# Patient Record
Sex: FEMALE | Race: WHITE | Employment: OTHER | URBAN - METROPOLITAN AREA
[De-identification: names, ages, dates, MRNs, and addresses within clinical notes are randomized per-mention and may not be internally consistent; named-entity substitution may affect disease eponyms.]

---

## 2018-05-09 ENCOUNTER — HOSPITAL ENCOUNTER (OUTPATIENT)
Dept: MAMMOGRAPHY | Age: 73
Discharge: HOME | End: 2018-05-09
Attending: FAMILY MEDICINE
Payer: MEDICARE

## 2018-05-09 DIAGNOSIS — Z12.31 ENCOUNTER FOR SCREENING MAMMOGRAM FOR MALIGNANT NEOPLASM OF BREAST: ICD-10-CM

## 2018-05-09 PROCEDURE — 77067 SCR MAMMO BI INCL CAD: CPT

## 2018-05-10 NOTE — PROGRESS NOTES
Mammogram - needs additional imaging for right breast due to indeterminate calcifications.   Radiology should contact patient to schedule

## 2018-05-16 ENCOUNTER — HOSPITAL ENCOUNTER (OUTPATIENT)
Dept: MAMMOGRAPHY | Age: 73
Discharge: HOME | End: 2018-05-16
Attending: FAMILY MEDICINE
Payer: MEDICARE

## 2018-05-16 DIAGNOSIS — R92.8 ABNORMAL MAMMOGRAM: ICD-10-CM

## 2018-05-16 PROCEDURE — 77065 DX MAMMO INCL CAD UNI: CPT

## 2018-05-30 ENCOUNTER — HOSPITAL ENCOUNTER (OUTPATIENT)
Dept: MAMMOGRAPHY | Age: 73
Discharge: HOME | End: 2018-05-30
Attending: FAMILY MEDICINE
Payer: MEDICARE

## 2018-05-30 VITALS
OXYGEN SATURATION: 94 % | SYSTOLIC BLOOD PRESSURE: 190 MMHG | TEMPERATURE: 96.9 F | HEART RATE: 78 BPM | DIASTOLIC BLOOD PRESSURE: 83 MMHG

## 2018-05-30 DIAGNOSIS — R92.1 BREAST CALCIFICATION, RIGHT: ICD-10-CM

## 2018-05-30 PROCEDURE — 74011250636 HC RX REV CODE- 250/636: Performed by: FAMILY MEDICINE

## 2018-05-30 PROCEDURE — 74011000250 HC RX REV CODE- 250: Performed by: FAMILY MEDICINE

## 2018-05-30 PROCEDURE — 19081 BX BREAST 1ST LESION STRTCTC: CPT

## 2018-05-30 PROCEDURE — 88305 TISSUE EXAM BY PATHOLOGIST: CPT | Performed by: FAMILY MEDICINE

## 2018-05-30 PROCEDURE — 77065 DX MAMMO INCL CAD UNI: CPT

## 2018-05-30 RX ORDER — LIDOCAINE HYDROCHLORIDE AND EPINEPHRINE 10; 10 MG/ML; UG/ML
10 INJECTION, SOLUTION INFILTRATION; PERINEURAL
Status: COMPLETED | OUTPATIENT
Start: 2018-05-30 | End: 2018-05-30

## 2018-05-30 RX ORDER — LIDOCAINE HYDROCHLORIDE 10 MG/ML
10 INJECTION INFILTRATION; PERINEURAL
Status: COMPLETED | OUTPATIENT
Start: 2018-05-30 | End: 2018-05-30

## 2018-05-30 RX ORDER — LIDOCAINE HYDROCHLORIDE AND EPINEPHRINE 10; 10 MG/ML; UG/ML
1.5 INJECTION, SOLUTION INFILTRATION; PERINEURAL
Status: COMPLETED | OUTPATIENT
Start: 2018-05-30 | End: 2018-05-30

## 2018-05-30 RX ADMIN — LIDOCAINE HYDROCHLORIDE,EPINEPHRINE BITARTRATE 3 ML: 10; .01 INJECTION, SOLUTION INFILTRATION; PERINEURAL at 12:02

## 2018-05-30 RX ADMIN — SODIUM CHLORIDE 250 ML: 900 INJECTION, SOLUTION INTRAVENOUS at 12:02

## 2018-05-30 RX ADMIN — LIDOCAINE HYDROCHLORIDE,EPINEPHRINE BITARTRATE 10 ML: 10; .01 INJECTION, SOLUTION INFILTRATION; PERINEURAL at 12:01

## 2018-05-30 RX ADMIN — LIDOCAINE HYDROCHLORIDE 4 ML: 10 INJECTION, SOLUTION INFILTRATION; PERINEURAL at 12:01

## 2019-03-07 PROBLEM — E66.01 SEVERE OBESITY (HCC): Status: ACTIVE | Noted: 2019-03-07

## 2019-03-20 PROBLEM — E11.65 UNCONTROLLED TYPE 2 DIABETES MELLITUS WITH HYPERGLYCEMIA (HCC): Status: ACTIVE | Noted: 2019-03-20

## 2019-04-08 ENCOUNTER — HOSPITAL ENCOUNTER (OUTPATIENT)
Dept: DIABETES SERVICES | Age: 74
Discharge: HOME | End: 2019-04-08
Attending: FAMILY MEDICINE
Payer: MEDICARE

## 2019-04-08 PROCEDURE — G0108 DIAB MANAGE TRN  PER INDIV: HCPCS

## 2019-04-08 NOTE — PROGRESS NOTES
Came for diabetes educational assessment today. Provided basic information on carbohydrates, proteins and fats. Educational need/plan: Will attend 2 nutrition/2 diabetes group sessions to address the following: diabetes disease process, nutritional management, physical activity, using medications, preventing complications, psychosocial adjustment, goal setting, problem solving, monitoring, behavior change strategies. Pt declined a glucometer. Pt has lost 20 lbs since Jan. 2019 since joining [facility].

## 2019-05-02 ENCOUNTER — HOSPITAL ENCOUNTER (OUTPATIENT)
Dept: DIABETES SERVICES | Age: 74
Discharge: HOME | End: 2019-05-02
Payer: MEDICARE

## 2019-05-02 PROCEDURE — G0109 DIAB MANAGE TRN IND/GROUP: HCPCS

## 2019-05-16 ENCOUNTER — HOSPITAL ENCOUNTER (OUTPATIENT)
Dept: DIABETES SERVICES | Age: 74
Discharge: HOME | End: 2019-05-16
Payer: MEDICARE

## 2019-05-16 PROCEDURE — G0109 DIAB MANAGE TRN IND/GROUP: HCPCS

## 2019-05-16 NOTE — PROGRESS NOTES
Attended nutrition diabetes #2 group session today. Topics included: plate method for portion control; fiber and sodium guidelines; sugar substitutes; alcohol; eating out; recipe modification; label reading. Participant's nutrition goal: To lower A1C and lose wt, she will limit carbs to 45 gram with most meals and eat a 7 gram protein with most meals and re-evaluate in 3 months. Participant's nutrition support plan: read food labels, use the [employer] guide, healthy dining finder web site and meal planning guide. Barriers identified by participant: cooking for  and eating out Voiced/demonstrated understanding of material covered. Anticipated adherence is good. Plan for follow up is attend diabetes 2 class.

## 2019-05-30 ENCOUNTER — HOSPITAL ENCOUNTER (OUTPATIENT)
Dept: DIABETES SERVICES | Age: 74
Discharge: HOME | End: 2019-05-30
Payer: MEDICARE

## 2019-05-30 PROCEDURE — G0109 DIAB MANAGE TRN IND/GROUP: HCPCS

## 2019-05-30 NOTE — PROGRESS NOTES
Participant attended Diabetes #2 session today. Topics included: Prevention/detection/treatment of chronic complications; sleep apnea; Developing strategies to promote health/change behavior/recommended screenings; Developing strategies to address psychosocial issues; Goal setting. Participants goal/support plan includes Diabetes Goal:  To exercise to lose weight. I will add pool walking 20-30 minutes neighborhood/pool 3-5 times a week began yesterday 5- and after 3 months will increase as tolerated to 5 times a week. Diabetes Plan:   and patient exercising together .  Problems/barriers may be:none anticipated   Plan for follow up/Recommendations: mail follow up survey in 3 months

## 2019-06-20 ENCOUNTER — HOSPITAL ENCOUNTER (OUTPATIENT)
Dept: MAMMOGRAPHY | Age: 74
Discharge: HOME | End: 2019-06-20
Attending: FAMILY MEDICINE
Payer: MEDICARE

## 2019-06-20 DIAGNOSIS — Z12.31 SCREENING MAMMOGRAM, ENCOUNTER FOR: ICD-10-CM

## 2019-06-20 PROCEDURE — 77067 SCR MAMMO BI INCL CAD: CPT

## 2019-07-31 ENCOUNTER — HOSPITAL ENCOUNTER (OUTPATIENT)
Dept: DIABETES SERVICES | Age: 74
Discharge: HOME | End: 2019-07-31
Payer: MEDICARE

## 2019-07-31 PROCEDURE — G0109 DIAB MANAGE TRN IND/GROUP: HCPCS

## 2019-07-31 NOTE — PROGRESS NOTES
Attended diabetes follow up group class. Open forum for clients to ask questions based on entire diabetes self management education program. Education topics are driven in this class based on clients' individual questions and needs. Topics included: carbohydrates, proteins, fats, fiber, meal planning, weight loss, label reading, recipe modification tips, portion control, stress and depression, medications, glucometer testing, signs/symptoms of high and low blood sugar and treatment and Hgb A1C.

## 2021-04-15 PROBLEM — M17.12 ARTHRITIS OF LEFT KNEE: Status: ACTIVE | Noted: 2021-04-15

## 2021-08-24 ENCOUNTER — TRANSCRIBE ORDER (OUTPATIENT)
Dept: SCHEDULING | Age: 76
End: 2021-08-24

## 2021-08-24 DIAGNOSIS — Z78.0 MENOPAUSE: Primary | ICD-10-CM

## 2021-08-24 DIAGNOSIS — Z12.31 VISIT FOR SCREENING MAMMOGRAM: Primary | ICD-10-CM

## 2021-10-12 ENCOUNTER — TRANSCRIBE ORDER (OUTPATIENT)
Dept: SCHEDULING | Age: 76
End: 2021-10-12

## 2021-10-12 DIAGNOSIS — Z12.31 SCREENING MAMMOGRAM FOR HIGH-RISK PATIENT: Primary | ICD-10-CM

## 2021-10-27 ENCOUNTER — HOSPITAL ENCOUNTER (OUTPATIENT)
Dept: MAMMOGRAPHY | Age: 76
Discharge: HOME | End: 2021-10-27
Attending: FAMILY MEDICINE
Payer: MEDICARE

## 2021-10-27 DIAGNOSIS — Z78.0 MENOPAUSE: ICD-10-CM

## 2021-10-27 PROCEDURE — 77080 DXA BONE DENSITY AXIAL: CPT

## 2021-10-29 PROBLEM — E78.00 HYPERCHOLESTEROLEMIA: Status: ACTIVE | Noted: 2021-10-08

## 2021-12-06 ENCOUNTER — HOSPITAL ENCOUNTER (OUTPATIENT)
Dept: MAMMOGRAPHY | Age: 76
Discharge: HOME | End: 2021-12-06
Attending: FAMILY MEDICINE
Payer: MEDICARE

## 2021-12-06 DIAGNOSIS — Z12.31 SCREENING MAMMOGRAM FOR HIGH-RISK PATIENT: ICD-10-CM

## 2021-12-06 PROCEDURE — 77063 BREAST TOMOSYNTHESIS BI: CPT

## 2022-03-19 PROBLEM — E66.01 SEVERE OBESITY (HCC): Status: ACTIVE | Noted: 2019-03-07

## 2022-03-19 PROBLEM — E11.65 UNCONTROLLED TYPE 2 DIABETES MELLITUS WITH HYPERGLYCEMIA (HCC): Status: ACTIVE | Noted: 2019-03-20

## 2022-03-19 PROBLEM — E78.00 HYPERCHOLESTEROLEMIA: Status: ACTIVE | Noted: 2021-10-08

## 2022-03-20 PROBLEM — M17.12 PRIMARY OSTEOARTHRITIS OF LEFT KNEE: Status: ACTIVE | Noted: 2021-04-15

## 2022-12-07 ENCOUNTER — HOSPITAL ENCOUNTER (OUTPATIENT)
Dept: MAMMOGRAPHY | Age: 77
Discharge: HOME | End: 2022-12-10
Payer: MEDICARE

## 2022-12-07 DIAGNOSIS — Z12.31 VISIT FOR SCREENING MAMMOGRAM: ICD-10-CM

## 2022-12-07 PROCEDURE — 77067 SCR MAMMO BI INCL CAD: CPT

## 2024-05-23 ENCOUNTER — TELEPHONE (OUTPATIENT)
Dept: ORTHOPEDIC SURGERY | Age: 79
End: 2024-05-23

## 2024-05-23 NOTE — TELEPHONE ENCOUNTER
Spoke with pt that was requesting a earlier appointment for an injection for her knee. She was instructed we will have to get new XR since we haven't seen her in a while. Gave her an appointment with Gunner Olsen tomorrow morning at [facility]. Pt expressed understanding.

## 2024-05-24 ENCOUNTER — OFFICE VISIT (OUTPATIENT)
Dept: ORTHOPEDIC SURGERY | Age: 79
End: 2024-05-24

## 2024-05-24 DIAGNOSIS — M17.0 BILATERAL PRIMARY OSTEOARTHRITIS OF KNEE: Primary | ICD-10-CM

## 2024-05-24 RX ORDER — METHYLPREDNISOLONE ACETATE 40 MG/ML
160 INJECTION, SUSPENSION INTRA-ARTICULAR; INTRALESIONAL; INTRAMUSCULAR; SOFT TISSUE ONCE
Status: COMPLETED | OUTPATIENT
Start: 2024-05-24 | End: 2024-05-24

## 2024-05-24 RX ADMIN — METHYLPREDNISOLONE ACETATE 160 MG: 40 INJECTION, SUSPENSION INTRA-ARTICULAR; INTRALESIONAL; INTRAMUSCULAR; SOFT TISSUE at 11:33

## 2024-05-24 NOTE — PROGRESS NOTES
discussion of risks and benefits including, but not limited to pain, infection, steroid flare, fat necrosis, skin discoloration, increased blood sugar, and injury to blood vessels or nerves, the patient verbally consented to proceed with injection of the affected knee.  We have discussed and they understand that decision to proceed with injection as part of the overall decision to avoid proceeding with major elective surgery.The Right knee(s) was sterilely prepped in standard fashion and injected with 2 cc of  depo medrol(40mg/ml), 2 cc of Naropin (0.5%). The patient tolerated the injection(s) well. The dressing(s) was(were) applied.   Procedure note:  After discussion of risks and benefits including, but not limited to pain, infection, steroid flare, fat necrosis, skin discoloration, increased blood sugar, and injury to blood vessels or nerves, the patient verbally consented to proceed with injection of the affected knee.  We have discussed and they understand that decision to proceed with injection as part of the overall decision to avoid proceeding with major elective surgery.  The Left knee(s) was(were) sterilely prepped in standard fashion and injected with 2 cc of depo medrol(40mg/ml), 2 cc of Naropin (0.5%). The patient tolerated the injection(s) well. The dressing(s) was(were) applied.     No follow-ups on file.        CORTNEY Roque  05/24/24

## 2024-06-10 ENCOUNTER — EVALUATION (OUTPATIENT)
Age: 79
End: 2024-06-10
Payer: MEDICARE

## 2024-06-10 DIAGNOSIS — M25.661 KNEE JOINT STIFFNESS, BILATERAL: ICD-10-CM

## 2024-06-10 DIAGNOSIS — R53.1 WEAKNESS GENERALIZED: Primary | ICD-10-CM

## 2024-06-10 DIAGNOSIS — M25.562 CHRONIC PAIN OF BOTH KNEES: ICD-10-CM

## 2024-06-10 DIAGNOSIS — M25.662 KNEE JOINT STIFFNESS, BILATERAL: ICD-10-CM

## 2024-06-10 DIAGNOSIS — M25.561 CHRONIC PAIN OF BOTH KNEES: ICD-10-CM

## 2024-06-10 DIAGNOSIS — Z74.09 DECREASED FUNCTIONAL MOBILITY AND ENDURANCE: ICD-10-CM

## 2024-06-10 DIAGNOSIS — G89.29 CHRONIC PAIN OF BOTH KNEES: ICD-10-CM

## 2024-06-10 DIAGNOSIS — R26.2 DIFFICULTY IN WALKING: ICD-10-CM

## 2024-06-10 PROCEDURE — 97162 PT EVAL MOD COMPLEX 30 MIN: CPT | Performed by: PHYSICAL THERAPIST

## 2024-06-10 PROCEDURE — 97110 THERAPEUTIC EXERCISES: CPT | Performed by: PHYSICAL THERAPIST

## 2024-06-10 NOTE — PROGRESS NOTES
[facility]  [address] [place] [id number]  Dept: [phone]      Physical Therapy Initial Assessment     Referring MD: Gunner Olsen PA  Diagnosis:     ICD-10-CM    1. Weakness generalized  R53.1       2. Difficulty in walking  R26.2       3. Decreased functional mobility and endurance  Z74.09       4. Knee joint stiffness, bilateral  M25.661     M25.662       5. Chronic pain of both knees  M25.561     M25.562     G89.29          Therapy precautions:Osteopenia/osteoporosis  Co-morbidities affecting plan of care: DM    Total Timed Procedure Codes: 18 min, Total Time: 47 min  Time In: 08:09 AM  Time Out: 08:56 AM  Visit: 1/20   Modifier needed: No    Payor: Payor: MEDICARE /  /  /  Billing pattern: Government- total time       PERTINENT MEDICAL HISTORY     Past medical and surgical history:   Past Medical History:   Diagnosis Date    Allergic rhinitis     Diabetes (HCC)     Herpes zoster May 2011    Hypercholesterolemia     Osteopenia     Bone density 10/21/16 - Tscore lumbar spine -1.5; hip -1.8    Pneumonia 1958 and 1964      Past Surgical History:   Procedure Laterality Date    APPENDECTOMY  1958    BREAST BIOPSY Right 05/30/2018    COLONOSCOPY  12/01/2016    polyps - ascending colon and rectum, diverticulosis    HEENT  1962    oral surgery    [facility] BREAST BIOPSY RIGHT Right 5/30/2018    [facility] LOC BREAST BIOPSY RIGHT 5/30/2018 [facility]    UPPER GASTROINTESTINAL ENDOSCOPY  12/18/2019    Cates's esophagus without dysplasia.  Diaphragmatic hernia.    UPPER GASTROINTESTINAL ENDOSCOPY  12/01/2016    barretts esophagitis.  diagphragmatic hernia.  acute gastritis    UPPER GASTROINTESTINAL ENDOSCOPY  09/17/2016    dysphagia - esophagitis; ?Barretts; hiatal hernia; mild gastritis; mild duodenitis.  repeat 2 months.  Dr. Carreno    WISDOM TOOTH EXTRACTION  2009     Medications: reviewed in chart   Allergies:   Allergies   Allergen Reactions    
hand held assist

## 2024-06-17 ENCOUNTER — TREATMENT (OUTPATIENT)
Age: 79
End: 2024-06-17
Payer: MEDICARE

## 2024-06-17 ENCOUNTER — OFFICE VISIT (OUTPATIENT)
Dept: ORTHOPEDIC SURGERY | Age: 79
End: 2024-06-17
Payer: MEDICARE

## 2024-06-17 DIAGNOSIS — R53.1 WEAKNESS GENERALIZED: Primary | ICD-10-CM

## 2024-06-17 DIAGNOSIS — M25.661 KNEE JOINT STIFFNESS, BILATERAL: ICD-10-CM

## 2024-06-17 DIAGNOSIS — Z74.09 DECREASED FUNCTIONAL MOBILITY AND ENDURANCE: ICD-10-CM

## 2024-06-17 DIAGNOSIS — M25.662 KNEE JOINT STIFFNESS, BILATERAL: ICD-10-CM

## 2024-06-17 DIAGNOSIS — R26.2 DIFFICULTY IN WALKING: ICD-10-CM

## 2024-06-17 DIAGNOSIS — M17.0 BILATERAL PRIMARY OSTEOARTHRITIS OF KNEE: Primary | ICD-10-CM

## 2024-06-17 PROCEDURE — 97110 THERAPEUTIC EXERCISES: CPT | Performed by: PHYSICAL THERAPIST

## 2024-06-17 PROCEDURE — L1852 KO DOUBLE UPRIGHT PREFAB OTS: HCPCS | Performed by: PHYSICIAN ASSISTANT

## 2024-06-17 NOTE — PROGRESS NOTES
The patient was seen in the office this morning by Richard Byrd with [facility]. CORTNEY Roque for Dr. Rudy Samuel, prescribed and ordered an  Double upright knee brace for the patient's left knee. Richard was able to fit the patient, and all questions were answered at the time of the visit.     Patient read and signed documenting they understand and agree to [facility]'s current DME return policy.

## 2024-06-17 NOTE — PROGRESS NOTES
[facility]  [address] [place] [id number]  Dept: [phone]      Physical Therapy Daily Note     Referring MD: Gunner Olsen PA  Diagnosis:     ICD-10-CM    1. Weakness generalized  R53.1       2. Difficulty in walking  R26.2       3. Decreased functional mobility and endurance  Z74.09       4. Knee joint stiffness, bilateral  M25.661     M25.662          Therapy precautions:Osteopenia/osteoporosis  Co-morbidities affecting plan of care: DM  Chief complaints/history of injury: Brooke Moya presents to PT with c/o bilateral knee pain.  States she received a cortisone injection at her last MD appointment and is now feeling relief.  States surgery has not been recommended at this time so treatment has involved cortisone injections, gel injections, and bracing.    Patient Stated Goals:   Sit to stand transfers  Be able to walk around her cul-de-sac   To be able to do cooking prep work in standing  To be able to fold laundry while standing      Payor: Payor: MEDICARE /  /  /  Billing pattern: Government- total time    Total Timed Procedure Codes: 30 min, Total Time: 30 min Modifier needed: No  Episode visit count:  2     SUBJECTIVE     Pt reports only problem with home program is finding time to complete it. Pt notes that cortisone injections took time to work but has helped significantly.      Medications: no changes since last session    OBJECTIVE       Treatment provided today:  Therapeutic exercise (82881) x 30 min to develop ROM, strength, endurance and flexibility BLEs.  Current Exercises Past Exercises (not performed today)   Assessment of tolerance of previous therapy session  Nustep level 4 x 5 min  Seated hamstring stretch H12sec x 5 B  Seated quad set H5sec x 10  Standing hip abduction 2x10 B- pause at top  Standing hip extension 2x10 B- pause at top  Standing heel raises 2x10  Standing TKE against large orange band H5sec x 15, stick support in RUE, therapist

## 2024-06-19 ENCOUNTER — TREATMENT (OUTPATIENT)
Age: 79
End: 2024-06-19
Payer: MEDICARE

## 2024-06-19 DIAGNOSIS — M25.561 CHRONIC PAIN OF BOTH KNEES: ICD-10-CM

## 2024-06-19 DIAGNOSIS — Z74.09 DECREASED FUNCTIONAL MOBILITY AND ENDURANCE: ICD-10-CM

## 2024-06-19 DIAGNOSIS — G89.29 CHRONIC PAIN OF BOTH KNEES: ICD-10-CM

## 2024-06-19 DIAGNOSIS — R26.2 DIFFICULTY IN WALKING: ICD-10-CM

## 2024-06-19 DIAGNOSIS — M25.662 KNEE JOINT STIFFNESS, BILATERAL: ICD-10-CM

## 2024-06-19 DIAGNOSIS — M25.661 KNEE JOINT STIFFNESS, BILATERAL: ICD-10-CM

## 2024-06-19 DIAGNOSIS — R53.1 WEAKNESS GENERALIZED: Primary | ICD-10-CM

## 2024-06-19 DIAGNOSIS — M25.562 CHRONIC PAIN OF BOTH KNEES: ICD-10-CM

## 2024-06-19 PROCEDURE — 97140 MANUAL THERAPY 1/> REGIONS: CPT

## 2024-06-19 PROCEDURE — 97110 THERAPEUTIC EXERCISES: CPT

## 2024-06-19 NOTE — PROGRESS NOTES
[facility]  [address] [place] [id number]  Dept: [phone]      Physical Therapy Daily Note     Referring MD: Gunner Olsen PA  Diagnosis:     ICD-10-CM    1. Weakness generalized  R53.1       2. Difficulty in walking  R26.2       3. Decreased functional mobility and endurance  Z74.09       4. Knee joint stiffness, bilateral  M25.661     M25.662       5. Chronic pain of both knees  M25.561     M25.562     G89.29            Therapy precautions:Osteopenia/osteoporosis  Co-morbidities affecting plan of care: DM  Chief complaints/history of injury: Brooke Moya presents to PT with c/o bilateral knee pain.  States she received a cortisone injection at her last MD appointment and is now feeling relief.  States surgery has not been recommended at this time so treatment has involved cortisone injections, gel injections, and bracing.    Patient Stated Goals:   Sit to stand transfers  Be able to walk around her cul-de-sac   To be able to do cooking prep work in standing  To be able to fold laundry while standing      Payor: Payor: MEDICARE /  /  /  Billing pattern: Government- total time    Total Timed Procedure Codes: 45 min, Total Time: 45 min Modifier needed: No  Episode visit count:  3     SUBJECTIVE     Patient reports getting a brace and wearing it to grocery store. She did not like how large it was and it slipped down and gave her lots of problems. She thinks she is going to return it. She has not made time to do the exercises but she felt really good after exercising last session.  She has cramps in her R leg every night.     Medications: no changes since last session    OBJECTIVE     Treatment provided today:  Therapeutic exercise (12692) x 30 min to develop ROM, strength, endurance and flexibility BLEs.  Current Exercises Past Exercises (not performed today)   Assessment of tolerance of previous therapy session  Nustep level 4 x 5 min  SLR 2 x 15 each side

## 2024-06-24 ENCOUNTER — TREATMENT (OUTPATIENT)
Age: 79
End: 2024-06-24
Payer: MEDICARE

## 2024-06-24 DIAGNOSIS — R26.2 DIFFICULTY IN WALKING: ICD-10-CM

## 2024-06-24 DIAGNOSIS — Z74.09 DECREASED FUNCTIONAL MOBILITY AND ENDURANCE: ICD-10-CM

## 2024-06-24 DIAGNOSIS — M25.662 KNEE JOINT STIFFNESS, BILATERAL: ICD-10-CM

## 2024-06-24 DIAGNOSIS — M25.562 CHRONIC PAIN OF BOTH KNEES: ICD-10-CM

## 2024-06-24 DIAGNOSIS — R53.1 WEAKNESS GENERALIZED: Primary | ICD-10-CM

## 2024-06-24 DIAGNOSIS — M25.561 CHRONIC PAIN OF BOTH KNEES: ICD-10-CM

## 2024-06-24 DIAGNOSIS — G89.29 CHRONIC PAIN OF BOTH KNEES: ICD-10-CM

## 2024-06-24 DIAGNOSIS — M25.661 KNEE JOINT STIFFNESS, BILATERAL: ICD-10-CM

## 2024-06-24 PROCEDURE — 97110 THERAPEUTIC EXERCISES: CPT | Performed by: PHYSICAL THERAPIST

## 2024-06-24 NOTE — PROGRESS NOTES
with Towel  - 3 x daily - 7 x weekly - 1 sets - 10 reps - 5 seconds hold  - Standing Hip Abduction with Counter Support  - 1-2 x daily - 5 x weekly - 3 sets - 10 reps  - Standing Hip Extension with Counter Support  - 1 x daily - 5 x weekly - 3 sets - 10 reps  - Standing Hip Flexion with Resistance Loop  - 1 x daily - 5 x weekly - 3 sets - 5 reps  - Ice  - 2 x daily - 7 x weekly - 15-20 duration (minutes)

## 2024-06-26 ENCOUNTER — TREATMENT (OUTPATIENT)
Age: 79
End: 2024-06-26
Payer: MEDICARE

## 2024-06-26 DIAGNOSIS — R53.1 WEAKNESS GENERALIZED: Primary | ICD-10-CM

## 2024-06-26 DIAGNOSIS — M25.661 KNEE JOINT STIFFNESS, BILATERAL: ICD-10-CM

## 2024-06-26 DIAGNOSIS — M25.662 KNEE JOINT STIFFNESS, BILATERAL: ICD-10-CM

## 2024-06-26 DIAGNOSIS — M25.561 CHRONIC PAIN OF BOTH KNEES: ICD-10-CM

## 2024-06-26 DIAGNOSIS — R26.2 DIFFICULTY IN WALKING: ICD-10-CM

## 2024-06-26 DIAGNOSIS — G89.29 CHRONIC PAIN OF BOTH KNEES: ICD-10-CM

## 2024-06-26 DIAGNOSIS — Z74.09 DECREASED FUNCTIONAL MOBILITY AND ENDURANCE: ICD-10-CM

## 2024-06-26 DIAGNOSIS — M25.562 CHRONIC PAIN OF BOTH KNEES: ICD-10-CM

## 2024-06-26 PROCEDURE — 97110 THERAPEUTIC EXERCISES: CPT | Performed by: PHYSICAL THERAPIST

## 2024-06-26 PROCEDURE — 97112 NEUROMUSCULAR REEDUCATION: CPT | Performed by: PHYSICAL THERAPIST

## 2024-06-26 NOTE — PROGRESS NOTES
8/5/2024  (8 weeks)  Patient will achieve left knee ROM ? 0°- 115° to improve ability to perform MRADLs.    Patient will achieve right knee ROM ? 0°- 120° to improve ability to perform MRADLs.    Pt. will be able to modify activities in order keep pain to minimal levels (? 3/10) with ADLs.    Patient will improve bilateral quadriceps strength to ? 4/5 to improve standing tolerance for performance of light house work.  Pt. will ascend and descend steps with a reciprocal gait pattern displaying good eccentric control and balance and without knee pain.   Improve LEFS to ? 50/80, which shows patient having decreased functional deficit related to knee injury.   Patient will achieve an average score on the Patient-Specific Functional Scale ? 7/10 indicating improving functional mobility.  Discharged from PT.       PLAN     Assess patient's tolerance to treatment and reassess bilateral knee ROM .     Effective Dates: 6/10/2024 TO 8/9/2024 (60 days).    Frequency/Duration: 2x/week for 60 Day(s)  Interventions may include but are not limited to:   (52445) Therapeutic exercise to develop ROM, strength, endurance and flexibility  (60152) Therapeutic activities using dynamic activities to improve function  (73575) Gait training to address mechanics, proper step length and weight shifting to improve household and community mobility as well as overall safety with ADLs  (41388) Manual therapy techniques to improve joint and/or soft tissue mobility, ROM, and function as well as helping to decrease pain/spasms and swelling  Home exercise program (HEP) development      Rachio  Access Code: [email or web address]  URL: [email or web address]  Date: 06/10/2024  Prepared by: Milagro Ritter DPT    Exercises  - Seated Inferior Patellar Glide  - 2 x daily - 7 x weekly  - Seated Medial Patellar Glide  - 2 x daily - 7 x weekly  - Seated Superior Patellar Glide  - 2 x daily - 7 x weekly  - Sitting Lateral Patellar Glide  - 2 x daily - 7 x

## 2024-06-30 NOTE — PROGRESS NOTES
5/10) with ADLs.    Patient will improve left quadriceps strength to ? 3+/5 to improve standing tolerance for performance of light house work.  Patient will achieve an average score on the Patient-Specific Functional Scale ? 5/10 indicating improving functional mobility.  Improve LEFS to ? 35/80 showing improved confidence in functional mobility.    Long term goals to be met by 8/5/2024  (8 weeks)  Patient will achieve left knee ROM ? 0°- 115° to improve ability to perform MRADLs.    Patient will achieve right knee ROM ? 0°- 120° to improve ability to perform MRADLs.    Pt. will be able to modify activities in order keep pain to minimal levels (? 3/10) with ADLs.    Patient will improve bilateral quadriceps strength to ? 4/5 to improve standing tolerance for performance of light house work.  Pt. will ascend and descend steps with a reciprocal gait pattern displaying good eccentric control and balance and without knee pain.   Improve LEFS to ? 50/80, which shows patient having decreased functional deficit related to knee injury.   Patient will achieve an average score on the Patient-Specific Functional Scale ? 7/10 indicating improving functional mobility.  Discharged from PT.       PLAN     Continue efforts to improve knee extension ROM bilaterally    Effective Dates: 6/10/2024 TO 8/9/2024 (60 days).    Frequency/Duration: 2x/week for 60 Day(s)  Interventions may include but are not limited to:   (12144) Therapeutic exercise to develop ROM, strength, endurance and flexibility  (60765) Therapeutic activities using dynamic activities to improve function  (89483) Gait training to address mechanics, proper step length and weight shifting to improve household and community mobility as well as overall safety with ADLs  (88111) Manual therapy techniques to improve joint and/or soft tissue mobility, ROM, and function as well as helping to decrease pain/spasms and swelling  Home exercise program (HEP)

## 2024-07-01 ENCOUNTER — TREATMENT (OUTPATIENT)
Age: 79
End: 2024-07-01
Payer: MEDICARE

## 2024-07-01 DIAGNOSIS — M25.661 KNEE JOINT STIFFNESS, BILATERAL: ICD-10-CM

## 2024-07-01 DIAGNOSIS — M25.561 CHRONIC PAIN OF BOTH KNEES: ICD-10-CM

## 2024-07-01 DIAGNOSIS — R53.1 WEAKNESS GENERALIZED: Primary | ICD-10-CM

## 2024-07-01 DIAGNOSIS — G89.29 CHRONIC PAIN OF BOTH KNEES: ICD-10-CM

## 2024-07-01 DIAGNOSIS — Z74.09 DECREASED FUNCTIONAL MOBILITY AND ENDURANCE: ICD-10-CM

## 2024-07-01 DIAGNOSIS — R26.2 DIFFICULTY IN WALKING: ICD-10-CM

## 2024-07-01 DIAGNOSIS — M25.662 KNEE JOINT STIFFNESS, BILATERAL: ICD-10-CM

## 2024-07-01 DIAGNOSIS — M25.562 CHRONIC PAIN OF BOTH KNEES: ICD-10-CM

## 2024-07-01 PROCEDURE — 97110 THERAPEUTIC EXERCISES: CPT | Performed by: PHYSICAL THERAPIST

## 2024-07-02 ENCOUNTER — TELEPHONE (OUTPATIENT)
Dept: ORTHOPEDIC SURGERY | Age: 79
End: 2024-07-02

## 2024-07-02 NOTE — TELEPHONE ENCOUNTER
Patient in office for consult with Richard, she returned the brace that was ordered she would like to get a lower profile  brace after injections and therapy . Per Richard single upright Climaflex to accomodates her needs - L 18.51

## 2024-07-03 ENCOUNTER — TREATMENT (OUTPATIENT)
Age: 79
End: 2024-07-03

## 2024-07-03 DIAGNOSIS — M25.561 CHRONIC PAIN OF BOTH KNEES: ICD-10-CM

## 2024-07-03 DIAGNOSIS — G89.29 CHRONIC PAIN OF BOTH KNEES: ICD-10-CM

## 2024-07-03 DIAGNOSIS — Z74.09 DECREASED FUNCTIONAL MOBILITY AND ENDURANCE: ICD-10-CM

## 2024-07-03 DIAGNOSIS — M25.662 KNEE JOINT STIFFNESS, BILATERAL: ICD-10-CM

## 2024-07-03 DIAGNOSIS — R26.2 DIFFICULTY IN WALKING: ICD-10-CM

## 2024-07-03 DIAGNOSIS — M25.562 CHRONIC PAIN OF BOTH KNEES: ICD-10-CM

## 2024-07-03 DIAGNOSIS — R53.1 WEAKNESS GENERALIZED: Primary | ICD-10-CM

## 2024-07-03 DIAGNOSIS — M25.661 KNEE JOINT STIFFNESS, BILATERAL: ICD-10-CM

## 2024-07-03 NOTE — PROGRESS NOTES
reporting 39% function.  Objective findings revealed generalized weakness in BLEs however most notable in bilateral hips and left quadriceps.  Patient showed increased loss in right extension ROM and continued loss in left knee extension ROM.  Overall deficits include:  Pain, Loss in ROM, Joint Hypomobility, Generalized Weakness, Abnormal Ambulation, Decreased Endurance, Impaired Balance/Proprioception, and Impaired Motor Function  These impairments interfere with the patient's ability to:  Sleep, Perform Home Duties, Safely Ambulate, Perform Stair Negotiation, Perform Transfers, Perform ADLs & IADLs, and Participate in Recreation Activities  Continued skilled PT is recommended to address the above impairments and continue progress towards remaining therapy goals.       GOALS     Short term goals to be met by 2024  (4 weeks)  Patient will demonstrate good recall of HEP requiring minimal verbal cuing for proper form and technique. - MET: 7/10/24    Patient will increase right knee extension ROM to ? 4° to normalize gait pattern.  - NOT MET: 7/10/24  Patient will increase left knee extension ROM to ? 0° to normalize gait pattern.  - NOT MET: 7/10/24  Patient will increase right knee flexion ROM to ? 115° to normalize gait pattern.  - MET: 7/10/24  Patient will increase left knee flexion ROM to ? 110° to normalize gait pattern.  - MET: 7/10/24  Pt. will be able to modify activities in order keep pain to minimal levels (? 5/10) with ADLs.   - MET: 7/10/24  Patient will improve left quadriceps strength to ? 3+/5 to improve standing tolerance for performance of light house work. - NOT MET: 7/10/24  Patient will achieve an average score on the Patient-Specific Functional Scale ? 5/10 indicating improving functional mobility. - MET: 7/10/24  Improve LEFS to ? 35/80 showing improved confidence in functional mobility. - PROGRESSIN/10/24 - score increased by 10 points to 31/80    Long term goals to be met by 2024

## 2024-07-03 NOTE — PROGRESS NOTES
mobility, ROM, and function as well as helping to decrease pain/spasms and swelling  Home exercise program (HEP) development      Thucy  Access Code: [email or web address]  URL: [email or web address]  Date: 07/01/2024  Prepared by: Milagro Ritter DPT    Exercises  - Seated Inferior Patellar Glide  - 2 x daily - 7 x weekly  - Seated Medial Patellar Glide  - 2 x daily - 7 x weekly  - Seated Superior Patellar Glide  - 2 x daily - 7 x weekly  - Sitting Lateral Patellar Glide  - 2 x daily - 7 x weekly  - Seated Hamstring Stretch  - 3 x daily - 7 x weekly - 1 sets - 5 reps - 12 seconds hold  - Seated Calf Stretch with Strap  - 3 x daily - 7 x weekly - 1 sets - 5 reps - 12 seconds hold  - Seated Quad Set  - 3 x daily - 7 x weekly - 1 sets - 10 reps - 5 seconds hold  - Sitting Heel Slide with Towel  - 3 x daily - 7 x weekly - 1 sets - 10 reps - 5 seconds hold  - Standing Hip Abduction with Counter Support  - 1-2 x daily - 5 x weekly - 3 sets - 10 reps  - Standing Hip Extension with Counter Support  - 1 x daily - 5 x weekly - 3 sets - 10 reps  - Standing Hip Flexion with Resistance Loop  - 1 x daily - 5 x weekly - 3 sets - 5 reps  - Ice  - 2 x daily - 7 x weekly - 15-20 duration (minutes)  - Seated Passive Knee Extension  - 3 x daily - 7 x weekly - 10 minutes duration    Patient Education  - Total Knee Replacement Handout  - Red Flags

## 2024-07-10 ENCOUNTER — TREATMENT (OUTPATIENT)
Age: 79
End: 2024-07-10
Payer: MEDICARE

## 2024-07-10 DIAGNOSIS — G89.29 CHRONIC PAIN OF BOTH KNEES: ICD-10-CM

## 2024-07-10 DIAGNOSIS — M25.661 KNEE JOINT STIFFNESS, BILATERAL: ICD-10-CM

## 2024-07-10 DIAGNOSIS — M25.562 CHRONIC PAIN OF BOTH KNEES: ICD-10-CM

## 2024-07-10 DIAGNOSIS — M25.662 KNEE JOINT STIFFNESS, BILATERAL: ICD-10-CM

## 2024-07-10 DIAGNOSIS — M25.561 CHRONIC PAIN OF BOTH KNEES: ICD-10-CM

## 2024-07-10 DIAGNOSIS — R53.1 WEAKNESS GENERALIZED: Primary | ICD-10-CM

## 2024-07-10 DIAGNOSIS — Z74.09 DECREASED FUNCTIONAL MOBILITY AND ENDURANCE: ICD-10-CM

## 2024-07-10 DIAGNOSIS — R26.2 DIFFICULTY IN WALKING: ICD-10-CM

## 2024-07-10 PROCEDURE — 97110 THERAPEUTIC EXERCISES: CPT | Performed by: PHYSICAL THERAPIST

## 2024-07-12 ENCOUNTER — TREATMENT (OUTPATIENT)
Age: 79
End: 2024-07-12

## 2024-07-12 DIAGNOSIS — R53.1 WEAKNESS GENERALIZED: Primary | ICD-10-CM

## 2024-07-12 DIAGNOSIS — M25.562 CHRONIC PAIN OF BOTH KNEES: ICD-10-CM

## 2024-07-12 DIAGNOSIS — G89.29 CHRONIC PAIN OF BOTH KNEES: ICD-10-CM

## 2024-07-12 DIAGNOSIS — M25.561 CHRONIC PAIN OF BOTH KNEES: ICD-10-CM

## 2024-07-12 DIAGNOSIS — M25.662 KNEE JOINT STIFFNESS, BILATERAL: ICD-10-CM

## 2024-07-12 DIAGNOSIS — R26.2 DIFFICULTY IN WALKING: ICD-10-CM

## 2024-07-12 DIAGNOSIS — M25.661 KNEE JOINT STIFFNESS, BILATERAL: ICD-10-CM

## 2024-07-12 DIAGNOSIS — Z74.09 DECREASED FUNCTIONAL MOBILITY AND ENDURANCE: ICD-10-CM

## 2024-07-12 NOTE — PROGRESS NOTES
[facility]  [address] [place] [id number]  Dept: [phone]      Physical Therapy Daily Note     Referring MD: Gunner Olsen PA  Diagnosis:     ICD-10-CM    1. Weakness generalized  R53.1       2. Difficulty in walking  R26.2       3. Chronic pain of both knees  M25.561     M25.562     G89.29       4. Decreased functional mobility and endurance  Z74.09       5. Knee joint stiffness, bilateral  M25.661     M25.662                      Therapy precautions:Osteopenia/osteoporosis  Co-morbidities affecting plan of care: DM  Chief complaints/history of injury: Brooke Moya presents to PT with c/o bilateral knee pain.  States she received a cortisone injection at her last MD appointment and is now feeling relief.  States surgery has not been recommended at this time so treatment has involved cortisone injections, gel injections, and bracing.    Patient Stated Goals:   Sit to stand transfers  Be able to walk around her cul-de-sac   To be able to do cooking prep work in standing  To be able to fold laundry while standing      Payor: Payor: MEDICARE /  /  /  Billing pattern: Government- total time    Total Timed Procedure Codes: 49 min, Total Time: 55 min Modifier needed: No  Time In: 08:40 AM  Time Out: 09:35 AM  Episode visit count:  9     SUBJECTIVE   Patient reports feeling much better today however is not 100% recovered from the anesthesia.        Medications: no changes since last session      OBJECTIVE     Treatment provided today:  Therapeutic exercise (52410) x 34 min to develop ROM, strength, endurance and flexibility BLEs.  Current Exercises Past Exercises (not performed today)   Assessment of tolerance of previous therapy session  Nustep level 4 x 6'  Slantboard Gastroc Stretch x 30\"  Standing heel raise - 3x10  Wall toe raises - 3x10  TKEs with maroon tube x 30 (B)  Declined squat with BUE support - 3x5  Bilateral knee extension machine, 10# - 3x8   Seated quad

## 2024-07-17 ENCOUNTER — TREATMENT (OUTPATIENT)
Age: 79
End: 2024-07-17
Payer: MEDICARE

## 2024-07-17 DIAGNOSIS — G89.29 CHRONIC PAIN OF BOTH KNEES: ICD-10-CM

## 2024-07-17 DIAGNOSIS — R26.2 DIFFICULTY IN WALKING: ICD-10-CM

## 2024-07-17 DIAGNOSIS — R53.1 WEAKNESS GENERALIZED: Primary | ICD-10-CM

## 2024-07-17 DIAGNOSIS — Z74.09 DECREASED FUNCTIONAL MOBILITY AND ENDURANCE: ICD-10-CM

## 2024-07-17 DIAGNOSIS — M25.662 KNEE JOINT STIFFNESS, BILATERAL: ICD-10-CM

## 2024-07-17 DIAGNOSIS — M25.562 CHRONIC PAIN OF BOTH KNEES: ICD-10-CM

## 2024-07-17 DIAGNOSIS — M25.661 KNEE JOINT STIFFNESS, BILATERAL: ICD-10-CM

## 2024-07-17 DIAGNOSIS — M25.561 CHRONIC PAIN OF BOTH KNEES: ICD-10-CM

## 2024-07-17 PROCEDURE — 97110 THERAPEUTIC EXERCISES: CPT | Performed by: PHYSICAL THERAPIST

## 2024-07-17 NOTE — PROGRESS NOTES
[email or web address]  URL: [email or web address]  Date: 07/01/2024  Prepared by: Milagro Ritter DPT    Exercises  - Seated Inferior Patellar Glide  - 2 x daily - 7 x weekly  - Seated Medial Patellar Glide  - 2 x daily - 7 x weekly  - Seated Superior Patellar Glide  - 2 x daily - 7 x weekly  - Sitting Lateral Patellar Glide  - 2 x daily - 7 x weekly  - Seated Hamstring Stretch  - 3 x daily - 7 x weekly - 1 sets - 5 reps - 12 seconds hold  - Seated Calf Stretch with Strap  - 3 x daily - 7 x weekly - 1 sets - 5 reps - 12 seconds hold  - Seated Quad Set  - 3 x daily - 7 x weekly - 1 sets - 10 reps - 5 seconds hold  - Sitting Heel Slide with Towel  - 3 x daily - 7 x weekly - 1 sets - 10 reps - 5 seconds hold  - Standing Hip Abduction with Counter Support  - 1-2 x daily - 5 x weekly - 3 sets - 10 reps  - Standing Hip Extension with Counter Support  - 1 x daily - 5 x weekly - 3 sets - 10 reps  - Standing Hip Flexion with Resistance Loop  - 1 x daily - 5 x weekly - 3 sets - 5 reps  - Ice  - 2 x daily - 7 x weekly - 15-20 duration (minutes)  - Seated Passive Knee Extension  - 3 x daily - 7 x weekly - 10 minutes duration    Patient Education  - Total Knee Replacement Handout  - Red Flags

## 2024-07-31 ENCOUNTER — TREATMENT (OUTPATIENT)
Age: 79
End: 2024-07-31
Payer: MEDICARE

## 2024-07-31 DIAGNOSIS — M25.661 KNEE JOINT STIFFNESS, BILATERAL: ICD-10-CM

## 2024-07-31 DIAGNOSIS — M25.561 CHRONIC PAIN OF BOTH KNEES: ICD-10-CM

## 2024-07-31 DIAGNOSIS — M25.562 CHRONIC PAIN OF BOTH KNEES: ICD-10-CM

## 2024-07-31 DIAGNOSIS — M25.662 KNEE JOINT STIFFNESS, BILATERAL: ICD-10-CM

## 2024-07-31 DIAGNOSIS — R26.2 DIFFICULTY IN WALKING: ICD-10-CM

## 2024-07-31 DIAGNOSIS — R53.1 WEAKNESS GENERALIZED: Primary | ICD-10-CM

## 2024-07-31 DIAGNOSIS — Z74.09 DECREASED FUNCTIONAL MOBILITY AND ENDURANCE: ICD-10-CM

## 2024-07-31 DIAGNOSIS — G89.29 CHRONIC PAIN OF BOTH KNEES: ICD-10-CM

## 2024-07-31 PROCEDURE — 97110 THERAPEUTIC EXERCISES: CPT | Performed by: PHYSICAL THERAPIST

## 2024-07-31 NOTE — PROGRESS NOTES
[facility]  [address] [facility] [id number]  Dept: [phone]      Physical Therapy Daily Note     Referring MD: Gunner Olsen PA  Diagnosis:     ICD-10-CM    1. Weakness generalized  R53.1       2. Difficulty in walking  R26.2       3. Chronic pain of both knees  M25.561     M25.562     G89.29       4. Decreased functional mobility and endurance  Z74.09       5. Knee joint stiffness, bilateral  M25.661     M25.662                  Therapy precautions:Osteopenia/osteoporosis  Co-morbidities affecting plan of care: DM  Chief complaints/history of injury: Brooke Moya presents to PT with c/o bilateral knee pain.  States she received a cortisone injection at her last MD appointment and is now feeling relief.  [facility] surgery has not been recommended at this time so treatment has involved cortisone injections, gel injections, and bracing.    Patient Stated Goals:   Sit to stand transfers  Be able to walk around her cul-de-sac   To be able to do cooking prep work in standing  To be able to fold laundry while standing      Payor: Payor: MEDICARE /  /  /  Billing pattern: Government- total time    Total Timed Procedure Codes: 36 min, Total Time: 36 min Modifier needed: No  Time In: 08:44 AM  Time Out: 09:20 AM  Episode visit count:  11     SUBJECTIVE   Patient reports 3/10 knee pain this morning.  [facility] she is scheduled for another round of injection therapy in August.      Medications: no changes since last session      OBJECTIVE     Treatment provided today:  Therapeutic exercise (69176) x 36 min to develop ROM, strength, endurance and flexibility BLEs.  Current Exercises Past Exercises (not performed today)   Assessment of tolerance of previous therapy session  Nustep level 4 x 6'  Slantboard Gastroc Stretch - 5x12\"  Heel drop on step  Wall Sits - 5x10\"  Wall toe raises - 3x10  Side stepping against     Right Forward Step-ups, UUE support, 6\" step - 3x5 (B)  Unilateral

## 2024-07-31 NOTE — PROGRESS NOTES
[facility]  [address] [place] [id number]  Dept: [phone]      Physical Therapy Progress Report     Referring MD: Gunner Olsen PA  Diagnosis:     ICD-10-CM    1. Weakness generalized  R53.1       2. Decreased functional mobility and endurance  Z74.09       3. Difficulty in walking  R26.2       4. Chronic pain of both knees  M25.561     M25.562     G89.29       5. Knee joint stiffness, bilateral  M25.661     M25.662                    Therapy precautions:Osteopenia/osteoporosis  Co-morbidities affecting plan of care: DM  Chief complaints/history of injury: Brooke Moya presents to PT with c/o bilateral knee pain.  States she received a cortisone injection at her last MD appointment and is now feeling relief.  States surgery has not been recommended at this time so treatment has involved cortisone injections, gel injections, and bracing.    Patient Stated Goals:   Sit to stand transfers  Be able to walk around her cul-de-sac   To be able to do cooking prep work in standing  To be able to fold laundry while standing      Payor: Payor: MEDICARE /  /  /  Billing pattern: Government- total time    Total Timed Procedure Codes: 36 min, Total Time: 36 min Modifier needed: No  Time In: 09:26 AM  Time Out: 10:02 AM  Episode visit count:  12     PROGRESS REPORT:       Nature of condition: Chronic (continuous duration > 3 months)  Describe current symptoms: Patient reports her standing tolerance in the kitchen has improved greatly.  However she does not like to  one spot for a prolonged position so she continues to do her laundry while sitting.  She has not yet started her walking program in the [facility] d/t the weather and gravely terrain.  However notes she does well with indoor walking.  Patient states the heel drop gastroc stretch causes cramps.  States she is scheduled for another cortisone injection at the end of the month and will discuss with her MD at

## 2024-08-05 ENCOUNTER — TREATMENT (OUTPATIENT)
Age: 79
End: 2024-08-05
Payer: MEDICARE

## 2024-08-05 DIAGNOSIS — M25.662 KNEE JOINT STIFFNESS, BILATERAL: ICD-10-CM

## 2024-08-05 DIAGNOSIS — Z74.09 DECREASED FUNCTIONAL MOBILITY AND ENDURANCE: ICD-10-CM

## 2024-08-05 DIAGNOSIS — M25.562 CHRONIC PAIN OF BOTH KNEES: ICD-10-CM

## 2024-08-05 DIAGNOSIS — M25.661 KNEE JOINT STIFFNESS, BILATERAL: ICD-10-CM

## 2024-08-05 DIAGNOSIS — R53.1 WEAKNESS GENERALIZED: Primary | ICD-10-CM

## 2024-08-05 DIAGNOSIS — M25.561 CHRONIC PAIN OF BOTH KNEES: ICD-10-CM

## 2024-08-05 DIAGNOSIS — G89.29 CHRONIC PAIN OF BOTH KNEES: ICD-10-CM

## 2024-08-05 DIAGNOSIS — R26.2 DIFFICULTY IN WALKING: ICD-10-CM

## 2024-08-05 PROCEDURE — 97110 THERAPEUTIC EXERCISES: CPT | Performed by: PHYSICAL THERAPIST

## 2024-08-29 ENCOUNTER — OFFICE VISIT (OUTPATIENT)
Dept: ORTHOPEDIC SURGERY | Age: 79
End: 2024-08-29
Payer: MEDICARE

## 2024-08-29 DIAGNOSIS — M17.0 BILATERAL PRIMARY OSTEOARTHRITIS OF KNEE: Primary | ICD-10-CM

## 2024-08-29 PROCEDURE — 20610 DRAIN/INJ JOINT/BURSA W/O US: CPT | Performed by: PHYSICIAN ASSISTANT

## 2024-08-29 RX ORDER — METHYLPREDNISOLONE ACETATE 40 MG/ML
160 INJECTION, SUSPENSION INTRA-ARTICULAR; INTRALESIONAL; INTRAMUSCULAR; SOFT TISSUE ONCE
Status: COMPLETED | OUTPATIENT
Start: 2024-08-29 | End: 2024-08-30

## 2024-08-29 NOTE — PROGRESS NOTES
Name: Brooke Moya  YOB: 1945  Gender: female  MRN: [id number]    CC:   Chief Complaint   Patient presents with    Injections     Bilateral knee CSI         HPI: They return today to have a BILATERAL knee injections.    Allergies   Allergen Reactions    Codeine Other (See Comments)     dizziness     Past Medical History:   Diagnosis Date    Allergic rhinitis     Diabetes (HCC)     Herpes zoster May 2011    Hypercholesterolemia     Osteopenia     Bone density 10/21/16 - Tscore lumbar spine -1.5; hip -1.8    Pneumonia 1958 and 1964     Past Surgical History:   Procedure Laterality Date    APPENDECTOMY  1958    BREAST BIOPSY Right 05/30/2018    COLONOSCOPY  12/01/2016    polyps - ascending colon and rectum, diverticulosis    HEENT  1962    oral surgery    [facility]OT[facility] LOC BREAST BIOPSY RIGHT Right 5/30/2018    [facility] STEROTACTIC LOC BREAST BIOPSY RIGHT 5/30/2018 [facility]    UPPER GASTROINTESTINAL ENDOSCOPY  12/18/2019    Cates's esophagus without dysplasia.  Diaphragmatic hernia.    UPPER GASTROINTESTINAL ENDOSCOPY  12/01/2016    barretts esophagitis.  diagphragmatic hernia.  acute gastritis    UPPER GASTROINTESTINAL ENDOSCOPY  09/17/2016    dysphagia - esophagitis; ?Barretts; hiatal hernia; mild gastritis; mild duodenitis.  repeat 2 months.  Dr. Carreno    WISDOM TOOTH EXTRACTION  2009     Family History   Problem Relation Age of Onset    Macular Degen Mother     Heart Disease Mother         angioplasty/stents    Cataracts Mother     Hypertension Mother     Diabetes Mother     Osteoporosis Maternal Grandmother     Cancer Father         throat/lung    Osteoporosis Mother      Social History     Socioeconomic History    Marital status:      Spouse name: Not on file    Number of children: Not on file    Years of education: Not on file    Highest education level: Not on file   Occupational History    Not on file   Tobacco Use    Smoking status: Never    Smokeless tobacco: Never  follow-up provider specified.     CORTNEY Roque  08/29/24

## 2024-08-30 RX ADMIN — METHYLPREDNISOLONE ACETATE 160 MG: 40 INJECTION, SUSPENSION INTRA-ARTICULAR; INTRALESIONAL; INTRAMUSCULAR; SOFT TISSUE at 10:16

## 2024-09-24 ENCOUNTER — CLINICAL DOCUMENTATION (OUTPATIENT)
Age: 79
End: 2024-09-24

## 2024-11-21 ENCOUNTER — OFFICE VISIT (OUTPATIENT)
Dept: ORTHOPEDIC SURGERY | Age: 79
End: 2024-11-21
Payer: MEDICARE

## 2024-11-21 DIAGNOSIS — M17.0 BILATERAL PRIMARY OSTEOARTHRITIS OF KNEE: Primary | ICD-10-CM

## 2024-11-21 PROCEDURE — 20610 DRAIN/INJ JOINT/BURSA W/O US: CPT | Performed by: PHYSICIAN ASSISTANT

## 2024-11-21 RX ORDER — METHYLPREDNISOLONE ACETATE 40 MG/ML
160 INJECTION, SUSPENSION INTRA-ARTICULAR; INTRALESIONAL; INTRAMUSCULAR; SOFT TISSUE ONCE
Status: COMPLETED | OUTPATIENT
Start: 2024-11-21 | End: 2024-11-21

## 2024-11-21 RX ADMIN — METHYLPREDNISOLONE ACETATE 160 MG: 40 INJECTION, SUSPENSION INTRA-ARTICULAR; INTRALESIONAL; INTRAMUSCULAR; SOFT TISSUE at 11:18

## 2024-11-21 NOTE — PROGRESS NOTES
Name: Brooke Moya  YOB: 1945  Gender: female  MRN: [id number]    CC:   Chief Complaint   Patient presents with    Injections     Bilateral knee CSI         HPI: They return today to have a BILATERAL knee injections.    Allergies   Allergen Reactions    Codeine Other (See Comments)     dizziness     Past Medical History:   Diagnosis Date    Allergic rhinitis     Diabetes (HCC)     Herpes zoster May 2011    Hypercholesterolemia     Osteopenia     Bone density 10/21/16 - Tscore lumbar spine -1.5; hip -1.8    Pneumonia 1958 and 1964     Past Surgical History:   Procedure Laterality Date    APPENDECTOMY  1958    BREAST BIOPSY Right 05/30/2018    COLONOSCOPY  12/01/2016    polyps - ascending colon and rectum, diverticulosis    HEENT  1962    oral surgery    [facility] STEROTACT[facility] LOC BREAST BIOPSY RIGHT Right 5/30/2018    [facility] STEROTACTIC LOC BREAST BIOPSY RIGHT 5/30/2018 [facility]    UPPER GASTROINTESTINAL ENDOSCOPY  12/18/2019    Cates's esophagus without dysplasia.  Diaphragmatic hernia.    UPPER GASTROINTESTINAL ENDOSCOPY  12/01/2016    barretts esophagitis.  diagphragmatic hernia.  acute gastritis    UPPER GASTROINTESTINAL ENDOSCOPY  09/17/2016    dysphagia - esophagitis; ?Barretts; hiatal hernia; mild gastritis; mild duodenitis.  repeat 2 months.  Dr. Carreno    WISDOM TOOTH EXTRACTION  2009     Family History   Problem Relation Age of Onset    Macular Degen Mother     Heart Disease Mother         angioplasty/stents    Cataracts Mother     Hypertension Mother     Diabetes Mother     Osteoporosis Maternal Grandmother     Cancer Father         throat/lung    Osteoporosis Mother      Social History     Socioeconomic History    Marital status:      Spouse name: Not on file    Number of children: Not on file    Years of education: Not on file    Highest education level: Not on file   Occupational History    Not on file   Tobacco Use    Smoking status: Never    Smokeless tobacco: Never

## 2024-12-19 ENCOUNTER — TRANSCRIBE ORDERS (OUTPATIENT)
Dept: SCHEDULING | Age: 79
End: 2024-12-19

## 2024-12-19 DIAGNOSIS — Z78.0 MENOPAUSE: Primary | ICD-10-CM

## 2025-01-20 ENCOUNTER — HOSPITAL ENCOUNTER (OUTPATIENT)
Dept: MAMMOGRAPHY | Age: 80
Discharge: HOME | End: 2025-01-23
Payer: MEDICARE

## 2025-01-20 DIAGNOSIS — Z78.0 MENOPAUSE: ICD-10-CM

## 2025-01-20 PROCEDURE — 77080 DXA BONE DENSITY AXIAL: CPT

## 2025-02-25 ENCOUNTER — OFFICE VISIT (OUTPATIENT)
Dept: ORTHOPEDIC SURGERY | Age: 80
End: 2025-02-25

## 2025-02-25 DIAGNOSIS — M17.0 BILATERAL PRIMARY OSTEOARTHRITIS OF KNEE: Primary | ICD-10-CM

## 2025-02-25 RX ORDER — METHYLPREDNISOLONE ACETATE 40 MG/ML
160 INJECTION, SUSPENSION INTRA-ARTICULAR; INTRALESIONAL; INTRAMUSCULAR; SOFT TISSUE ONCE
Status: COMPLETED | OUTPATIENT
Start: 2025-02-25 | End: 2025-02-25

## 2025-02-25 RX ADMIN — METHYLPREDNISOLONE ACETATE 160 MG: 40 INJECTION, SUSPENSION INTRA-ARTICULAR; INTRALESIONAL; INTRAMUSCULAR; SOFT TISSUE at 09:17

## 2025-02-25 NOTE — PROGRESS NOTES
Name: Brooke Myoa  YOB: 1945  Gender: female  MRN: [id number]    CC:   Chief Complaint   Patient presents with    Injections     Bilateral knee CSI         HPI: They return today to have a BILATERAL knee injections.    Allergies   Allergen Reactions    Codeine Other (See Comments)     dizziness     Past Medical History:   Diagnosis Date    Allergic rhinitis     Diabetes (HCC)     Herpes zoster May 2011    Hypercholesterolemia     Osteopenia     Bone density 10/21/16 - Tscore lumbar spine -1.5; hip -1.8    Pneumonia 1958 and 1964     Past Surgical History:   Procedure Laterality Date    APPENDECTOMY  1958    BREAST BIOPSY Right 05/30/2018    COLONOSCOPY  12/01/2016    polyps - ascending colon and rectum, diverticulosis    HEENT  1962    oral surgery    [facility] STEREO BREAST BX W LOC DEVICE 1ST LESION RIGHT Right 5/30/2018    [facility] STEROTACTIC LOC BREAST BIOPSY RIGHT 5/30/2018 [facility]    UPPER GASTROINTESTINAL ENDOSCOPY  12/18/2019    Cates's esophagus without dysplasia.  Diaphragmatic hernia.    UPPER GASTROINTESTINAL ENDOSCOPY  12/01/2016    barretts esophagitis.  diagphragmatic hernia.  acute gastritis    UPPER GASTROINTESTINAL ENDOSCOPY  09/17/2016    dysphagia - esophagitis; ?Barretts; hiatal hernia; mild gastritis; mild duodenitis.  repeat 2 months.  Dr. Carreno    WISDOM TOOTH EXTRACTION  2009     Family History   Problem Relation Age of Onset    Macular Degen Mother     Heart Disease Mother         angioplasty/stents    Cataracts Mother     Hypertension Mother     Diabetes Mother     Osteoporosis Maternal Grandmother     Cancer Father         throat/lung    Osteoporosis Mother      Social History     Socioeconomic History    Marital status:      Spouse name: Not on file    Number of children: Not on file    Years of education: Not on file    Highest education level: Not on file   Occupational History    Not on file   Tobacco Use    Smoking status: Never    Smokeless

## 2025-06-04 ENCOUNTER — OFFICE VISIT (OUTPATIENT)
Dept: ORTHOPEDIC SURGERY | Age: 80
End: 2025-06-04
Payer: MEDICARE

## 2025-06-04 DIAGNOSIS — M17.0 BILATERAL PRIMARY OSTEOARTHRITIS OF KNEE: Primary | ICD-10-CM

## 2025-06-04 PROCEDURE — 20610 DRAIN/INJ JOINT/BURSA W/O US: CPT | Performed by: PHYSICIAN ASSISTANT

## 2025-06-04 RX ORDER — METHYLPREDNISOLONE ACETATE 40 MG/ML
160 INJECTION, SUSPENSION INTRA-ARTICULAR; INTRALESIONAL; INTRAMUSCULAR; SOFT TISSUE ONCE
Status: COMPLETED | OUTPATIENT
Start: 2025-06-04 | End: 2025-06-04

## 2025-06-04 RX ADMIN — METHYLPREDNISOLONE ACETATE 160 MG: 40 INJECTION, SUSPENSION INTRA-ARTICULAR; INTRALESIONAL; INTRAMUSCULAR; SOFT TISSUE at 08:46

## 2025-06-04 NOTE — PROGRESS NOTES
Name: Brooke Moya  YOB: 1945  Gender: female  MRN: [id number]    CC:   Chief Complaint   Patient presents with    Follow-up     Bilateral knee CSI         HPI: They return today to have a BILATERAL knee injections.  Last injections helped about 1.5-2 months.  We have previously discussed viscosupplementation.  We discussed information about these injections in detail as well as ultimately surgical intervention which would be a joint replacement. She is walking poorly and we discussed getting established with a total joint surgeon given her disability but she is still hesitant to discuss proceeding.  She may call back to get visco appointment vs total joint appointment set up.     Allergies   Allergen Reactions    Codeine Other (See Comments)     dizziness     Past Medical History:   Diagnosis Date    Allergic rhinitis     Diabetes (HCC)     Herpes zoster May 2011    Hypercholesterolemia     Osteopenia     Bone density 10/21/16 - Tscore lumbar spine -1.5; hip -1.8    Pneumonia 1958 and 1964     Past Surgical History:   Procedure Laterality Date    APPENDECTOMY  1958    BREAST BIOPSY Right 05/30/2018    COLONOSCOPY  12/01/2016    polyps - ascending colon and rectum, diverticulosis    HEENT  1962    oral surgery    [facility] STEREO BREAST BX W LOC DEVICE 1ST LESION RIGHT Right 5/30/2018    [facility] STEROTACTIC LOC BREAST BIOPSY RIGHT 5/30/2018 [facility]    UPPER GASTROINTESTINAL ENDOSCOPY  12/18/2019    Cates's esophagus without dysplasia.  Diaphragmatic hernia.    UPPER GASTROINTESTINAL ENDOSCOPY  12/01/2016    barretts esophagitis.  diagphragmatic hernia.  acute gastritis    UPPER GASTROINTESTINAL ENDOSCOPY  09/17/2016    dysphagia - esophagitis; ?Barretts; hiatal hernia; mild gastritis; mild duodenitis.  repeat 2 months.  Dr. Carreno    WISDOM TOOTH EXTRACTION  2009     Family History   Problem Relation Age of Onset    Macular Degen Mother     Heart Disease Mother         angioplasty/stents